# Patient Record
Sex: MALE
[De-identification: names, ages, dates, MRNs, and addresses within clinical notes are randomized per-mention and may not be internally consistent; named-entity substitution may affect disease eponyms.]

---

## 2024-05-09 PROBLEM — Z00.00 ENCOUNTER FOR PREVENTIVE HEALTH EXAMINATION: Status: ACTIVE | Noted: 2024-05-09

## 2024-05-14 ENCOUNTER — APPOINTMENT (OUTPATIENT)
Dept: ORTHOPEDIC SURGERY | Facility: CLINIC | Age: 84
End: 2024-05-14
Payer: MEDICARE

## 2024-05-14 DIAGNOSIS — M25.551 PAIN IN RIGHT HIP: ICD-10-CM

## 2024-05-14 PROCEDURE — 99203 OFFICE O/P NEW LOW 30 MIN: CPT

## 2024-05-15 ENCOUNTER — APPOINTMENT (OUTPATIENT)
Dept: PAIN MANAGEMENT | Facility: CLINIC | Age: 84
End: 2024-05-15
Payer: MEDICARE

## 2024-05-15 PROCEDURE — 99204 OFFICE O/P NEW MOD 45 MIN: CPT

## 2024-05-15 PROCEDURE — G2211 COMPLEX E/M VISIT ADD ON: CPT

## 2024-05-15 RX ORDER — VALSARTAN 160 MG/1
160 TABLET, COATED ORAL
Refills: 0 | Status: ACTIVE | COMMUNITY

## 2024-05-15 RX ORDER — ATORVASTATIN CALCIUM 20 MG/1
20 TABLET, FILM COATED ORAL
Refills: 0 | Status: ACTIVE | COMMUNITY

## 2024-05-15 RX ORDER — DORZOLAMIDE HYDROCHLORIDE TIMOLOL MALEATE 20; 5 MG/ML; MG/ML
2-0.5 SOLUTION/ DROPS OPHTHALMIC
Refills: 0 | Status: ACTIVE | COMMUNITY

## 2024-05-15 RX ORDER — AMLODIPINE BESYLATE 10 MG/1
10 TABLET ORAL
Refills: 0 | Status: ACTIVE | COMMUNITY

## 2024-05-15 NOTE — PHYSICAL EXAM
[de-identified] : R hip   No rashes, erythema, edema TTP at anterior hip, gluteus, GTB Stability: no gross instability ROM: Painful & limited ROM  MARK + Gait: limping

## 2024-05-15 NOTE — DISCUSSION/SUMMARY
[de-identified] : A discussion regarding available pain management treatment options occurred with the patient. These included interventional, rehabilitative, pharmacological, and alternative modalities. We will proceed with the following:   Interventional treatment options: 1. Proceed with a right hip injection under fluoroscopic guidance.  The patient is having significant R hip pain with minimal relief with conservative treatments so we decided to proceed with an intra-articular hip injection. The risks and benefits were discussed which included bruising, hematoma, worse pain, intravascular injection, steroid reaction. All questions were answered and concerns addressed. The patient understands that this is likely a temporary solution to their pain. The patient may have up to three intra-articular joint injections a year and needs to consider surgical options for longer term relief of pain should they not improve. They will schedule at the earliest convenience.   - Follow up after inj.   I Marizol Huertas attest that this documentation has been prepared under the direction and in the presence of provider Dr. Spike Burger.  The documentation recorded by the scribe in my presence, accurately reflects the service I personally performed, and the decisions made by me with my edits as appropriate.   Spike Burger, DO

## 2024-05-15 NOTE — HISTORY OF PRESENT ILLNESS
[FreeTextEntry1] : Mr. Fox is an 84-year-old male presenting to establish care for his right hip pain. He is accompanied by his daughter for today's visit. He has had this pain for about 1 year. He has been seen by Orthopedics, Dr. Sutherland, and was diagnosed with right hip osteoarthritis. He continues with complaints of right hip pain which refers into the groin and into the anterior portion of the thigh. He notes a constant aching sensation in the hip along with a sharp pain at times. Pain is made worse with any weight bearing or when getting into or out of the car. He does also note tenderness to palpation over the hip. Pain is present daily and limits his ADLs.

## 2024-05-28 ENCOUNTER — APPOINTMENT (OUTPATIENT)
Dept: PAIN MANAGEMENT | Facility: CLINIC | Age: 84
End: 2024-05-28
Payer: MEDICARE

## 2024-05-28 PROCEDURE — 20610 DRAIN/INJ JOINT/BURSA W/O US: CPT | Mod: RT

## 2024-05-28 PROCEDURE — 77002 NEEDLE LOCALIZATION BY XRAY: CPT

## 2024-05-29 NOTE — PROCEDURE
[FreeTextEntry3] : Date of Service: 05/28/2024   Account: [id number]  Patient: BRENT RHODES   YOB: 1940  Age: 84 year  Surgeon:      Spike Burger DO  Pre-Operative Diagnosis: Right Primary Osteoarthritis of Hip (M16.0)  Post-Operative Diagnosis: Same  Procedure: Right Hip arthrogram and steroid injection under fluoroscopic guidance.    This procedure was carried out using fluoroscopic guidance. The risks and benefits of the procedure were discussed extensively with the patient. The consent of the patient was obtained and the following procedure was performed. The patient was placed in the supine position on the fluoroscopic table and the area was prepped and draped in a sterile fashion. A timeout was performed with all essential staff present and the site and side were verified.  The patient was placed in the supine position with right hip flexed and externally rotated 25 degrees. The area of the right groin was prepped and draped in a sterile fashion. The fluoroscopic image intensifier was then positioned so that the right hip appeared in view, and the midline intertrochanteric region was identified and marked. A 25 gauge spinal needle was then inserted and directed into this right hip intra-capsular region. After negative aspiration for heme and CSF, 3 cc of Omnipaque was injected and appeared to fill the joint margins.  Right hip arthrogram showed no intravascular flow, and good spread around the femoral head and to the acetabulum. An injectate of 4 cc 0.25% Marcaine plus 10mg decadron was then injected into the right hip space.   The needle was subsequently removed. Vital signs remained normal. Pulse oximeter was used throughout the procedure and the patient's pulse and oxygen saturation remained within normal limits. The patient tolerated the procedure well. There were no complications. The patient was instructed to apply ice over the injection sites for twenty minutes every two hours for the next 24 to 48 hours.  Disposition:   1. The patient was advised to F/U in 1-2 weeks to assess the response to the injection.  2. The patient was also instructed to contact me immediately if there were any concerns related to the procedure performed.

## 2024-06-04 RX ORDER — TRAMADOL HYDROCHLORIDE 50 MG/1
50 TABLET, COATED ORAL
Qty: 42 | Refills: 0 | Status: ACTIVE | COMMUNITY
Start: 2024-06-04 | End: 1900-01-01

## 2024-06-12 ENCOUNTER — APPOINTMENT (OUTPATIENT)
Dept: PAIN MANAGEMENT | Facility: CLINIC | Age: 84
End: 2024-06-12
Payer: MEDICARE

## 2024-06-12 VITALS — HEIGHT: 67 IN | BODY MASS INDEX: 27.78 KG/M2 | WEIGHT: 177 LBS

## 2024-06-12 DIAGNOSIS — M16.11 UNILATERAL PRIMARY OSTEOARTHRITIS, RIGHT HIP: ICD-10-CM

## 2024-06-12 PROCEDURE — 99212 OFFICE O/P EST SF 10 MIN: CPT

## 2024-06-12 NOTE — PHYSICAL EXAM
[de-identified] : R hip   No rashes, erythema, edema TTP at anterior hip, gluteus, GTB Stability: no gross instability ROM: limited ROM  MARK + Gait: limping

## 2024-06-12 NOTE — DISCUSSION/SUMMARY
[de-identified] : A discussion regarding available pain management treatment options occurred with the patient. These included interventional, rehabilitative, pharmacological, and alternative modalities. We will proceed with the following:   Interventional treatment options: - None indicated at this time.   - Follow up in 4 months. If his pain returns, I will proceed with a repeat injection. He is also scheduled to see Orthopedics which I advised him to do as well.   I Marizol Huertas attest that this documentation has been prepared under the direction and in the presence of provider Dr. Spike Burger.  The documentation recorded by the scribe in my presence, accurately reflects the service I personally performed, and the decisions made by me with my edits as appropriate.   Spike Burger, DO

## 2024-06-12 NOTE — HISTORY OF PRESENT ILLNESS
[FreeTextEntry1] : Mr. Fox is an 84-year-old male presenting to establish care for his right hip pain. He is accompanied by his daughter for today's visit. He has had this pain for about 1 year. He has been seen by Orthopedics, Dr. Sutherland, and was diagnosed with right hip osteoarthritis. He continues with complaints of right hip pain which refers into the groin and into the anterior portion of the thigh. He notes a constant aching sensation in the hip along with a sharp pain at times. Pain is made worse with any weight bearing or when getting into or out of the car. He does also note tenderness to palpation over the hip. Pain is present daily and limits his ADLs.   TODAY, 6-: Revisit encounter. He is accompanied by his daughter today.   Since his last visit, he underwent a right hip injection on 5-. He has about 100% continued relief from this procedure. He denies any true pain. He does note some ongoing pressure in the hip however he is able to manage with the pressure like symptoms. He does continue to use a cane to ambulate for extra stability.

## 2024-06-14 ENCOUNTER — APPOINTMENT (OUTPATIENT)
Dept: ORTHOPEDIC SURGERY | Facility: CLINIC | Age: 84
End: 2024-06-14

## 2024-08-13 ENCOUNTER — APPOINTMENT (OUTPATIENT)
Dept: ORTHOPEDIC SURGERY | Facility: CLINIC | Age: 84
End: 2024-08-13
Payer: MEDICARE

## 2024-08-13 DIAGNOSIS — M16.11 UNILATERAL PRIMARY OSTEOARTHRITIS, RIGHT HIP: ICD-10-CM

## 2024-08-13 PROCEDURE — 99213 OFFICE O/P EST LOW 20 MIN: CPT

## 2024-08-13 PROCEDURE — 73502 X-RAY EXAM HIP UNI 2-3 VIEWS: CPT

## 2024-08-21 NOTE — HISTORY OF PRESENT ILLNESS
[de-identified] : f/u of right hip  has oa of right hip so far has been treated with tramadol and articular injections symptoms at this point stable surgery discussed upon today visit, imaging reviewed which does show oa can consider total hip replacement should nonop mngt be ineffective.

## 2024-09-10 ENCOUNTER — APPOINTMENT (OUTPATIENT)
Dept: PAIN MANAGEMENT | Facility: CLINIC | Age: 84
End: 2024-09-10
Payer: MEDICARE

## 2024-09-10 DIAGNOSIS — M25.551 PAIN IN RIGHT HIP: ICD-10-CM

## 2024-09-10 PROCEDURE — 99214 OFFICE O/P EST MOD 30 MIN: CPT

## 2024-09-10 NOTE — DISCUSSION/SUMMARY
[de-identified] : A discussion regarding available pain management treatment options occurred with the patient. These included interventional, rehabilitative, pharmacological, and alternative modalities. We will proceed with the following:   Interventional treatment options: 1. Proceed with a Right hip injection under fluoroscopic guidance.  The patient is having significant left hip pain with minimal relief with conservative treatments so we decided to proceed with an intra-articular hip injection. The risks and benefits were discussed which included bruising, hematoma, worse pain, intravascular injection, steroid reaction. All questions were answered and concerns addressed. The patient understands that this is likely a temporary solution to their pain. The patient may have up to three intra-articular joint injections a year and needs to consider surgical options for longer term relief of pain should they not improve. They will schedule at the earliest convenience.   - Follow up 2 weeks post injection.   I Marizol Huertas attest that this documentation has been prepared under the direction and in the presence of provider Dr. Spike Burger.  The documentation recorded by the scribe in my presence, accurately reflects the service I personally performed, and the decisions made by me with my edits as appropriate.   Spike Burger, DO

## 2024-09-10 NOTE — PHYSICAL EXAM
[de-identified] : R hip   No rashes, erythema, edema TTP at anterior hip, gluteus, GTB Stability: no gross instability ROM: limited ROM  MARK + Gait: limping   There is a need for assistive device such as cane/walker. the patient would benefit from a parking permit as well given his functional loss. 
[de-identified] : R hip   No rashes, erythema, edema TTP at anterior hip, gluteus, GTB Stability: no gross instability ROM: limited ROM  MARK + Gait: limping   There is a need for assistive device such as cane/walker. the patient would benefit from a parking permit as well given his functional loss. 
History/Exam/Medical Decision Making

## 2024-09-10 NOTE — HISTORY OF PRESENT ILLNESS
[FreeTextEntry1] : Mr. Fox is an 84-year-old male presenting to establish care for his right hip pain. He is accompanied by his daughter for today's visit. He has had this pain for about 1 year. He has been seen by Orthopedics, Dr. Sutherland, and was diagnosed with right hip osteoarthritis. He continues with complaints of right hip pain which refers into the groin and into the anterior portion of the thigh. He notes a constant aching sensation in the hip along with a sharp pain at times. Pain is made worse with any weight bearing or when getting into or out of the car. He does also note tenderness to palpation over the hip. Pain is present daily and limits his ADLs.   TODAY, 6-: Revisit encounter. He is accompanied by his daughter today.   Since his last visit, he underwent a right hip injection on 5-. He has about 100% continued relief from this procedure. He denies any true pain. He does note some ongoing pressure in the hip however he is able to manage with the pressure like symptoms. He does continue to use a cane to ambulate for extra stability.   9-: Revisit encounter. He is accompanied by his daughter today.   Since his last visit, he was seen by Dr. Abelino Gordon. He did recommend a total hip replacement should he not want to proceed with any additional interventional treatments. He is presenting with stable pain in the right hip. Symptoms continue to be in the hip and refers into the groin and into the anterior portion of the thigh at times. Symptoms are pain worse with weight bearing or getting in and out of the car. He continues to ambulate with a cane.

## 2024-09-10 NOTE — DISCUSSION/SUMMARY
[de-identified] : A discussion regarding available pain management treatment options occurred with the patient. These included interventional, rehabilitative, pharmacological, and alternative modalities. We will proceed with the following:   Interventional treatment options: 1. Proceed with a Right hip injection under fluoroscopic guidance.  The patient is having significant left hip pain with minimal relief with conservative treatments so we decided to proceed with an intra-articular hip injection. The risks and benefits were discussed which included bruising, hematoma, worse pain, intravascular injection, steroid reaction. All questions were answered and concerns addressed. The patient understands that this is likely a temporary solution to their pain. The patient may have up to three intra-articular joint injections a year and needs to consider surgical options for longer term relief of pain should they not improve. They will schedule at the earliest convenience.   - Follow up 2 weeks post injection.   I Marizol Huertas attest that this documentation has been prepared under the direction and in the presence of provider Dr. Spike Burger.  The documentation recorded by the scribe in my presence, accurately reflects the service I personally performed, and the decisions made by me with my edits as appropriate.   Spike Burger, DO

## 2024-09-12 ENCOUNTER — APPOINTMENT (OUTPATIENT)
Dept: PAIN MANAGEMENT | Facility: CLINIC | Age: 84
End: 2024-09-12
Payer: MEDICARE

## 2024-09-12 DIAGNOSIS — M16.11 UNILATERAL PRIMARY OSTEOARTHRITIS, RIGHT HIP: ICD-10-CM

## 2024-09-12 PROCEDURE — 77002 NEEDLE LOCALIZATION BY XRAY: CPT

## 2024-09-12 PROCEDURE — 20610 DRAIN/INJ JOINT/BURSA W/O US: CPT | Mod: RT

## 2024-09-13 NOTE — PROCEDURE
[FreeTextEntry3] : Date of Service: 09/12/2024   Account: [id number]  Patient: BRENT RHODES   YOB: 1940  Age: 84 year  Surgeon:      Spike Burger DO  Pre-Operative Diagnosis: Right Primary Osteoarthritis of Hip (M16.0)  Post-Operative Diagnosis: Same  Procedure: Right Hip arthrogram and steroid injection under fluoroscopic guidance.    This procedure was carried out using fluoroscopic guidance. The risks and benefits of the procedure were discussed extensively with the patient. The consent of the patient was obtained and the following procedure was performed. The patient was placed in the supine position on the fluoroscopic table and the area was prepped and draped in a sterile fashion. A timeout was performed with all essential staff present and the site and side were verified.  The patient was placed in the supine position with right hip flexed and externally rotated 25 degrees. The area of the right groin was prepped and draped in a sterile fashion. The fluoroscopic image intensifier was then positioned so that the right hip appeared in view, and the midline intertrochanteric region was identified and marked. A 25 gauge spinal needle was then inserted and directed into this right hip intra-capsular region. After negative aspiration for heme and CSF, 3 cc of Omnipaque was injected and appeared to fill the joint margins.  Right hip arthrogram showed no intravascular flow, and good spread around the femoral head and to the acetabulum. An injectate of 4 cc 0.25% Marcaine plus 10mg decadron was then injected into the right hip space.   The needle was subsequently removed. Vital signs remained normal. Pulse oximeter was used throughout the procedure and the patient's pulse and oxygen saturation remained within normal limits. The patient tolerated the procedure well. There were no complications. The patient was instructed to apply ice over the injection sites for twenty minutes every two hours for the next 24 to 48 hours.  Disposition:   1. The patient was advised to F/U in 1-2 weeks to assess the response to the injection.  2. The patient was also instructed to contact me immediately if there were any concerns related to the procedure performed.

## 2024-09-24 ENCOUNTER — APPOINTMENT (OUTPATIENT)
Dept: PAIN MANAGEMENT | Facility: CLINIC | Age: 84
End: 2024-09-24
Payer: MEDICARE

## 2024-09-24 DIAGNOSIS — M25.561 PAIN IN RIGHT KNEE: ICD-10-CM

## 2024-09-24 DIAGNOSIS — M16.11 UNILATERAL PRIMARY OSTEOARTHRITIS, RIGHT HIP: ICD-10-CM

## 2024-09-24 DIAGNOSIS — M25.562 PAIN IN RIGHT KNEE: ICD-10-CM

## 2024-09-24 PROCEDURE — 99214 OFFICE O/P EST MOD 30 MIN: CPT

## 2024-09-25 NOTE — DISCUSSION/SUMMARY
[de-identified] : A discussion regarding available pain management treatment options occurred with the patient. These included interventional, rehabilitative, pharmacological, and alternative modalities. We will proceed with the following:   Interventional treatment options: - None indicated at this time. He is scheduled to follow up with Dr. Ndiaye to discuss hip replacement.   Imagin. Ordered X-rays of the bilateral knees due to pain and decrease in range of motion in that area to delineate a pain generator.   - Follow up as needed.   I Marizol Huertas attest that this documentation has been prepared under the direction and in the presence of provider Dr. Spike Burger.  The documentation recorded by the scribe in my presence, accurately reflects the service I personally performed, and the decisions made by me with my edits as appropriate.   Spike Burger, DO

## 2024-09-25 NOTE — HISTORY OF PRESENT ILLNESS
[FreeTextEntry1] : Mr. Fox is an 84-year-old male presenting to establish care for his right hip pain. He is accompanied by his daughter for today's visit. He has had this pain for about 1 year. He has been seen by Orthopedics, Dr. Sutherland, and was diagnosed with right hip osteoarthritis. He continues with complaints of right hip pain which refers into the groin and into the anterior portion of the thigh. He notes a constant aching sensation in the hip along with a sharp pain at times. Pain is made worse with any weight bearing or when getting into or out of the car. He does also note tenderness to palpation over the hip. Pain is present daily and limits his ADLs.   TODAY, 6-: Revisit encounter. He is accompanied by his daughter today.   Since his last visit, he underwent a right hip injection on 5-. He has about 100% continued relief from this procedure. He denies any true pain. He does note some ongoing pressure in the hip however he is able to manage with the pressure like symptoms. He does continue to use a cane to ambulate for extra stability.   9-: Revisit encounter. He is accompanied by his daughter today.   Since his last visit, he was seen by Dr. Abelino Gordon. He did recommend a total hip replacement should he not want to proceed with any additional interventional treatments. He is presenting with stable pain in the right hip. Symptoms continue to be in the hip and refers into the groin and into the anterior portion of the thigh at times. Symptoms are pain worse with weight bearing or getting in and out of the car. He continues to ambulate with a cane.   9-: Revisit encounter.   Since his last visit, he underwent a right hip injection on 9-. He affords about 50% sustained relief from this procedure. He continues today with ongoing pain however it is not as bothersome as it was prior to the injection. He continues with some ongoing stiffness and aching pain in the hip especially with weight bearing. He continues to use a cane to ambulate. He is scheduled to follow up with Dr. West to discuss hip replacement. He has yet to use any Tramadol for the pain.

## 2024-09-25 NOTE — DISCUSSION/SUMMARY
[de-identified] : A discussion regarding available pain management treatment options occurred with the patient. These included interventional, rehabilitative, pharmacological, and alternative modalities. We will proceed with the following:   Interventional treatment options: - None indicated at this time. He is scheduled to follow up with Dr. Ndiaye to discuss hip replacement.   Imagin. Ordered X-rays of the bilateral knees due to pain and decrease in range of motion in that area to delineate a pain generator.   - Follow up as needed.   I Marizol Huertas attest that this documentation has been prepared under the direction and in the presence of provider Dr. Spike Burger.  The documentation recorded by the scribe in my presence, accurately reflects the service I personally performed, and the decisions made by me with my edits as appropriate.   Spike Burger, DO

## 2024-09-25 NOTE — PHYSICAL EXAM
[de-identified] : R hip   No rashes, erythema, edema TTP at anterior hip, gluteus, GTB Stability: no gross instability ROM: limited ROM  MARK + Gait: limping   There is a need for assistive device such as cane/walker. the patient would benefit from a parking permit as well given his functional loss.

## 2024-09-25 NOTE — PHYSICAL EXAM
[de-identified] : R hip   No rashes, erythema, edema TTP at anterior hip, gluteus, GTB Stability: no gross instability ROM: limited ROM  MARK + Gait: limping   There is a need for assistive device such as cane/walker. the patient would benefit from a parking permit as well given his functional loss.

## 2024-11-26 ENCOUNTER — APPOINTMENT (OUTPATIENT)
Dept: ORTHOPEDIC SURGERY | Facility: CLINIC | Age: 84
End: 2024-11-26
Payer: MEDICARE

## 2024-11-26 PROCEDURE — 99214 OFFICE O/P EST MOD 30 MIN: CPT

## 2024-12-03 ENCOUNTER — APPOINTMENT (OUTPATIENT)
Dept: PAIN MANAGEMENT | Facility: CLINIC | Age: 84
End: 2024-12-03
Payer: MEDICARE

## 2024-12-03 DIAGNOSIS — M16.11 UNILATERAL PRIMARY OSTEOARTHRITIS, RIGHT HIP: ICD-10-CM

## 2024-12-03 DIAGNOSIS — M25.551 PAIN IN RIGHT HIP: ICD-10-CM

## 2024-12-03 PROCEDURE — 99213 OFFICE O/P EST LOW 20 MIN: CPT

## 2024-12-31 ENCOUNTER — RESULT REVIEW (OUTPATIENT)
Age: 84
End: 2024-12-31

## 2024-12-31 ENCOUNTER — OUTPATIENT (OUTPATIENT)
Dept: OUTPATIENT SERVICES | Facility: HOSPITAL | Age: 84
LOS: 1 days | End: 2024-12-31
Payer: MEDICARE

## 2024-12-31 VITALS
WEIGHT: 177.03 LBS | HEIGHT: 67 IN | TEMPERATURE: 98 F | SYSTOLIC BLOOD PRESSURE: 153 MMHG | OXYGEN SATURATION: 99 % | RESPIRATION RATE: 16 BRPM | DIASTOLIC BLOOD PRESSURE: 76 MMHG | HEART RATE: 78 BPM

## 2024-12-31 DIAGNOSIS — Z98.890 OTHER SPECIFIED POSTPROCEDURAL STATES: Chronic | ICD-10-CM

## 2024-12-31 DIAGNOSIS — H26.9 UNSPECIFIED CATARACT: Chronic | ICD-10-CM

## 2024-12-31 DIAGNOSIS — Z01.818 ENCOUNTER FOR OTHER PREPROCEDURAL EXAMINATION: ICD-10-CM

## 2024-12-31 LAB
A1C WITH ESTIMATED AVERAGE GLUCOSE RESULT: 5.8 % — HIGH (ref 4–5.6)
ALBUMIN SERPL ELPH-MCNC: 4.4 G/DL — SIGNIFICANT CHANGE UP (ref 3.5–5.2)
ALP SERPL-CCNC: 57 U/L — SIGNIFICANT CHANGE UP (ref 30–115)
ALT FLD-CCNC: 8 U/L — SIGNIFICANT CHANGE UP (ref 0–41)
ANION GAP SERPL CALC-SCNC: 13 MMOL/L — SIGNIFICANT CHANGE UP (ref 7–14)
APTT BLD: 30.9 SEC — SIGNIFICANT CHANGE UP (ref 27–39.2)
AST SERPL-CCNC: 12 U/L — SIGNIFICANT CHANGE UP (ref 0–41)
BASOPHILS # BLD AUTO: 0.04 K/UL — SIGNIFICANT CHANGE UP (ref 0–0.2)
BASOPHILS NFR BLD AUTO: 0.7 % — SIGNIFICANT CHANGE UP (ref 0–1)
BILIRUB SERPL-MCNC: 0.4 MG/DL — SIGNIFICANT CHANGE UP (ref 0.2–1.2)
BLD GP AB SCN SERPL QL: SIGNIFICANT CHANGE UP
BUN SERPL-MCNC: 17 MG/DL — SIGNIFICANT CHANGE UP (ref 10–20)
CALCIUM SERPL-MCNC: 9.5 MG/DL — SIGNIFICANT CHANGE UP (ref 8.4–10.5)
CHLORIDE SERPL-SCNC: 106 MMOL/L — SIGNIFICANT CHANGE UP (ref 98–110)
CO2 SERPL-SCNC: 22 MMOL/L — SIGNIFICANT CHANGE UP (ref 17–32)
CREAT SERPL-MCNC: 0.9 MG/DL — SIGNIFICANT CHANGE UP (ref 0.7–1.5)
EGFR: 85 ML/MIN/1.73M2 — SIGNIFICANT CHANGE UP
EOSINOPHIL # BLD AUTO: 0.09 K/UL — SIGNIFICANT CHANGE UP (ref 0–0.7)
EOSINOPHIL NFR BLD AUTO: 1.5 % — SIGNIFICANT CHANGE UP (ref 0–8)
ESTIMATED AVERAGE GLUCOSE: 120 MG/DL — HIGH (ref 68–114)
GLUCOSE SERPL-MCNC: 104 MG/DL — HIGH (ref 70–99)
HCT VFR BLD CALC: 43.1 % — SIGNIFICANT CHANGE UP (ref 42–52)
HGB BLD-MCNC: 14.2 G/DL — SIGNIFICANT CHANGE UP (ref 14–18)
IMM GRANULOCYTES NFR BLD AUTO: 0.7 % — HIGH (ref 0.1–0.3)
INR BLD: 0.86 RATIO — SIGNIFICANT CHANGE UP (ref 0.65–1.3)
LYMPHOCYTES # BLD AUTO: 0.69 K/UL — LOW (ref 1.2–3.4)
LYMPHOCYTES # BLD AUTO: 11.3 % — LOW (ref 20.5–51.1)
MCHC RBC-ENTMCNC: 29 PG — SIGNIFICANT CHANGE UP (ref 27–31)
MCHC RBC-ENTMCNC: 32.9 G/DL — SIGNIFICANT CHANGE UP (ref 32–37)
MCV RBC AUTO: 88.1 FL — SIGNIFICANT CHANGE UP (ref 80–94)
MONOCYTES # BLD AUTO: 0.6 K/UL — SIGNIFICANT CHANGE UP (ref 0.1–0.6)
MONOCYTES NFR BLD AUTO: 9.8 % — HIGH (ref 1.7–9.3)
MRSA PCR RESULT.: NEGATIVE — SIGNIFICANT CHANGE UP
NEUTROPHILS # BLD AUTO: 4.67 K/UL — SIGNIFICANT CHANGE UP (ref 1.4–6.5)
NEUTROPHILS NFR BLD AUTO: 76 % — HIGH (ref 42.2–75.2)
NRBC # BLD: 0 /100 WBCS — SIGNIFICANT CHANGE UP (ref 0–0)
PLATELET # BLD AUTO: 268 K/UL — SIGNIFICANT CHANGE UP (ref 130–400)
PMV BLD: 10.3 FL — SIGNIFICANT CHANGE UP (ref 7.4–10.4)
POTASSIUM SERPL-MCNC: 4.6 MMOL/L — SIGNIFICANT CHANGE UP (ref 3.5–5)
POTASSIUM SERPL-SCNC: 4.6 MMOL/L — SIGNIFICANT CHANGE UP (ref 3.5–5)
PROT SERPL-MCNC: 7.1 G/DL — SIGNIFICANT CHANGE UP (ref 6–8)
PROTHROM AB SERPL-ACNC: 10.1 SEC — SIGNIFICANT CHANGE UP (ref 9.95–12.87)
RBC # BLD: 4.89 M/UL — SIGNIFICANT CHANGE UP (ref 4.7–6.1)
RBC # FLD: 14.1 % — SIGNIFICANT CHANGE UP (ref 11.5–14.5)
SODIUM SERPL-SCNC: 141 MMOL/L — SIGNIFICANT CHANGE UP (ref 135–146)
WBC # BLD: 6.13 K/UL — SIGNIFICANT CHANGE UP (ref 4.8–10.8)
WBC # FLD AUTO: 6.13 K/UL — SIGNIFICANT CHANGE UP (ref 4.8–10.8)

## 2024-12-31 PROCEDURE — 73502 X-RAY EXAM HIP UNI 2-3 VIEWS: CPT | Mod: RT

## 2024-12-31 PROCEDURE — 93005 ELECTROCARDIOGRAM TRACING: CPT

## 2024-12-31 PROCEDURE — 85730 THROMBOPLASTIN TIME PARTIAL: CPT

## 2024-12-31 PROCEDURE — 83036 HEMOGLOBIN GLYCOSYLATED A1C: CPT

## 2024-12-31 PROCEDURE — 36415 COLL VENOUS BLD VENIPUNCTURE: CPT

## 2024-12-31 PROCEDURE — 87640 STAPH A DNA AMP PROBE: CPT

## 2024-12-31 PROCEDURE — 93010 ELECTROCARDIOGRAM REPORT: CPT

## 2024-12-31 PROCEDURE — 85610 PROTHROMBIN TIME: CPT

## 2024-12-31 PROCEDURE — 85025 COMPLETE CBC W/AUTO DIFF WBC: CPT

## 2024-12-31 PROCEDURE — 86850 RBC ANTIBODY SCREEN: CPT

## 2024-12-31 PROCEDURE — 86900 BLOOD TYPING SEROLOGIC ABO: CPT

## 2024-12-31 PROCEDURE — 73502 X-RAY EXAM HIP UNI 2-3 VIEWS: CPT | Mod: 26,RT

## 2024-12-31 PROCEDURE — 99214 OFFICE O/P EST MOD 30 MIN: CPT | Mod: 25

## 2024-12-31 PROCEDURE — 87641 MR-STAPH DNA AMP PROBE: CPT

## 2024-12-31 PROCEDURE — 86901 BLOOD TYPING SEROLOGIC RH(D): CPT

## 2024-12-31 PROCEDURE — 80053 COMPREHEN METABOLIC PANEL: CPT

## 2024-12-31 NOTE — H&P PST ADULT - NSICDXPASTMEDICALHX_GEN_ALL_CORE_FT
PAST MEDICAL HISTORY:  BCC (basal cell carcinoma)     Benign essential HTN     BPH (benign prostatic hyperplasia)     Cataract     Hyperlipidemia

## 2024-12-31 NOTE — H&P PST ADULT - REASON FOR ADMISSION
Case Type: OP [facility]  Confirmed Surgery DateTime: Procedure: Right Total Hip Replacement  : YesLaterality: RightLength of Procedure: 120 Minutes  Anesthesia Type: Regional

## 2024-12-31 NOTE — H&P PST ADULT - NSICDXFAMILYHX_GEN_ALL_CORE_FT
FAMILY HISTORY:  Father  Still living? No  Family history of diabetes mellitus (DM), Age at diagnosis: Age Unknown  FH: heart attack, Age at diagnosis: Age Unknown    Mother  Still living? No  Family history of cerebral hemorrhage, Age at diagnosis: Age Unknown

## 2024-12-31 NOTE — H&P PST ADULT - NSANTHOSAYNRD_GEN_A_CORE
No. GARRET screening performed.  STOP BANG Legend: 0-2 = LOW Risk; 3-4 = INTERMEDIATE Risk; 5-8 = HIGH Risk

## 2024-12-31 NOTE — H&P PST ADULT - HISTORY OF PRESENT ILLNESS
PATIENT CURRENTLY DENIES CHEST PAIN  SHORTNESS OF BREATH  PALPITATIONS,  DYSURIA, OR UPPER RESPIRATORY INFECTION IN PAST 2 WEEKS  PATIENT REPORTS HISTORY OF OSTEOARTHRITIS FOR MANY YEARS.  PAIN AT TIMES ON THE HIP, 5-6 ON PAIN SCALE, CONTROLLED BY TYLENOL, DENIES FALL  SCHEDULED FOR SURGERY ABOVE.  Denies travel outside the [place] in the past 30 days  Patient denies any signs or symptoms of COVID 19 and denies contact with known positive individuals.         YES Anesthesia Alert--Difficult Airway CLASS IV  NO--History of neck surgery or radiation  NO--Limited ROM of neck  NO--History of Malignant hyperthermia  NO--No personal or family history of Pseudocholinesterase deficiency.  NO--Prior Anesthesia Complication  NO--Latex Allergy  NO--Loose teeth  NO--History of Rheumatoid Arthritis  NO--Bleeding risk  NO--GARRET  RIGHT EYE PATCH, CATARACT SURGERY 12/23/24_____    DASI 5.07    RCRI  1    PT DENIES ANY RASHES, ABRASION, OR OPEN WOUNDS OR CUTS    AS PER THE PT, THIS IS HIS/HER COMPLETE MEDICAL AND SURGICAL HX, INCLUDING MEDICATIONS PRESCRIBED AND OVER THE COUNTER    Patient/Guardian understands the instructions and was given the opportunity to ask questions and have them answered.    pt denies any suicidal ideation or thoughts, pt states not a threat to self or others

## 2025-01-01 DIAGNOSIS — M16.11 UNILATERAL PRIMARY OSTEOARTHRITIS, RIGHT HIP: ICD-10-CM

## 2025-01-01 DIAGNOSIS — Z01.818 ENCOUNTER FOR OTHER PREPROCEDURAL EXAMINATION: ICD-10-CM

## 2025-01-14 ENCOUNTER — APPOINTMENT (OUTPATIENT)
Dept: PAIN MANAGEMENT | Facility: CLINIC | Age: 85
End: 2025-01-14

## 2025-01-20 ENCOUNTER — RESULT REVIEW (OUTPATIENT)
Age: 85
End: 2025-01-20

## 2025-01-20 ENCOUNTER — INPATIENT (INPATIENT)
Facility: HOSPITAL | Age: 85
LOS: 0 days | Discharge: HOME HEALTH | DRG: 554 | End: 2025-01-21
Attending: ORTHOPAEDIC SURGERY | Admitting: ORTHOPAEDIC SURGERY
Payer: MEDICARE

## 2025-01-20 ENCOUNTER — APPOINTMENT (OUTPATIENT)
Dept: ORTHOPEDIC SURGERY | Facility: HOSPITAL | Age: 85
End: 2025-01-20

## 2025-01-20 VITALS
HEART RATE: 89 BPM | HEIGHT: 67 IN | OXYGEN SATURATION: 99 % | WEIGHT: 177.03 LBS | TEMPERATURE: 97 F | RESPIRATION RATE: 18 BRPM | SYSTOLIC BLOOD PRESSURE: 153 MMHG

## 2025-01-20 DIAGNOSIS — M16.11 UNILATERAL PRIMARY OSTEOARTHRITIS, RIGHT HIP: ICD-10-CM

## 2025-01-20 DIAGNOSIS — H26.9 UNSPECIFIED CATARACT: Chronic | ICD-10-CM

## 2025-01-20 DIAGNOSIS — Z98.890 OTHER SPECIFIED POSTPROCEDURAL STATES: Chronic | ICD-10-CM

## 2025-01-20 LAB
BLD GP AB SCN SERPL QL: SIGNIFICANT CHANGE UP
GLUCOSE BLDC GLUCOMTR-MCNC: 119 MG/DL — HIGH (ref 70–99)

## 2025-01-20 PROCEDURE — 36415 COLL VENOUS BLD VENIPUNCTURE: CPT

## 2025-01-20 PROCEDURE — 94010 BREATHING CAPACITY TEST: CPT

## 2025-01-20 PROCEDURE — 80048 BASIC METABOLIC PNL TOTAL CA: CPT

## 2025-01-20 PROCEDURE — 97162 PT EVAL MOD COMPLEX 30 MIN: CPT | Mod: GP

## 2025-01-20 PROCEDURE — C1776: CPT

## 2025-01-20 PROCEDURE — 88311 DECALCIFY TISSUE: CPT | Mod: 26

## 2025-01-20 PROCEDURE — 88311 DECALCIFY TISSUE: CPT

## 2025-01-20 PROCEDURE — 85027 COMPLETE CBC AUTOMATED: CPT

## 2025-01-20 PROCEDURE — 97165 OT EVAL LOW COMPLEX 30 MIN: CPT | Mod: GO

## 2025-01-20 PROCEDURE — 27130 TOTAL HIP ARTHROPLASTY: CPT | Mod: RT

## 2025-01-20 PROCEDURE — 73502 X-RAY EXAM HIP UNI 2-3 VIEWS: CPT | Mod: RT

## 2025-01-20 PROCEDURE — 88305 TISSUE EXAM BY PATHOLOGIST: CPT

## 2025-01-20 PROCEDURE — 88305 TISSUE EXAM BY PATHOLOGIST: CPT | Mod: 26

## 2025-01-20 RX ORDER — CELECOXIB 200 MG
200 CAPSULE ORAL ONCE
Refills: 0 | Status: COMPLETED | OUTPATIENT
Start: 2025-01-20 | End: 2025-01-20

## 2025-01-20 RX ORDER — ANTISEPTIC SURGICAL SCRUB 0.04 MG/ML
1 SOLUTION TOPICAL
Refills: 0 | Status: DISCONTINUED | OUTPATIENT
Start: 2025-01-20 | End: 2025-01-21

## 2025-01-20 RX ORDER — POLYETHYLENE GLYCOL 3350 17 G/17G
17 POWDER, FOR SOLUTION ORAL AT BEDTIME
Refills: 0 | Status: DISCONTINUED | OUTPATIENT
Start: 2025-01-20 | End: 2025-01-21

## 2025-01-20 RX ORDER — LATANOPROST 50 UG/ML
1 SOLUTION OPHTHALMIC AT BEDTIME
Refills: 0 | Status: DISCONTINUED | OUTPATIENT
Start: 2025-01-20 | End: 2025-01-21

## 2025-01-20 RX ORDER — ONDANSETRON 4 MG/1
4 TABLET, ORALLY DISINTEGRATING ORAL ONCE
Refills: 0 | Status: DISCONTINUED | OUTPATIENT
Start: 2025-01-20 | End: 2025-01-20

## 2025-01-20 RX ORDER — HYDROMORPHONE HYDROCHLORIDE 4 MG/ML
0.5 INJECTION, SOLUTION INTRAMUSCULAR; INTRAVENOUS; SUBCUTANEOUS
Refills: 0 | Status: DISCONTINUED | OUTPATIENT
Start: 2025-01-20 | End: 2025-01-20

## 2025-01-20 RX ORDER — ATORVASTATIN CALCIUM 80 MG/1
20 TABLET, FILM COATED ORAL AT BEDTIME
Refills: 0 | Status: DISCONTINUED | OUTPATIENT
Start: 2025-01-20 | End: 2025-01-21

## 2025-01-20 RX ORDER — OXYCODONE HYDROCHLORIDE 30 MG/1
5 TABLET ORAL ONCE
Refills: 0 | Status: DISCONTINUED | OUTPATIENT
Start: 2025-01-20 | End: 2025-01-20

## 2025-01-20 RX ORDER — SODIUM CHLORIDE 9 G/ML
1000 INJECTION, SOLUTION INTRAVENOUS
Refills: 0 | Status: DISCONTINUED | OUTPATIENT
Start: 2025-01-20 | End: 2025-01-20

## 2025-01-20 RX ORDER — PANTOPRAZOLE 20 MG/1
40 TABLET, DELAYED RELEASE ORAL
Refills: 0 | Status: DISCONTINUED | OUTPATIENT
Start: 2025-01-20 | End: 2025-01-21

## 2025-01-20 RX ORDER — ATORVASTATIN CALCIUM 40 MG/1
1 TABLET, FILM COATED ORAL
Refills: 0 | DISCHARGE

## 2025-01-20 RX ORDER — ACETAMINOPHEN 160 MG/5ML
1000 SUSPENSION ORAL ONCE
Refills: 0 | Status: COMPLETED | OUTPATIENT
Start: 2025-01-20 | End: 2025-01-20

## 2025-01-20 RX ORDER — MAGNESIUM HYDROXIDE 400 MG/5ML
30 SUSPENSION, ORAL (FINAL DOSE FORM) ORAL DAILY
Refills: 0 | Status: DISCONTINUED | OUTPATIENT
Start: 2025-01-20 | End: 2025-01-21

## 2025-01-20 RX ORDER — TRAMADOL HYDROCHLORIDE 100 MG/1
50 TABLET, EXTENDED RELEASE ORAL EVERY 4 HOURS
Refills: 0 | Status: DISCONTINUED | OUTPATIENT
Start: 2025-01-20 | End: 2025-01-21

## 2025-01-20 RX ORDER — ONDANSETRON 4 MG/1
4 TABLET, ORALLY DISINTEGRATING ORAL EVERY 6 HOURS
Refills: 0 | Status: DISCONTINUED | OUTPATIENT
Start: 2025-01-20 | End: 2025-01-21

## 2025-01-20 RX ORDER — ACETAMINOPHEN 160 MG/5ML
650 SUSPENSION ORAL EVERY 6 HOURS
Refills: 0 | Status: DISCONTINUED | OUTPATIENT
Start: 2025-01-20 | End: 2025-01-21

## 2025-01-20 RX ORDER — DUTASTERIDE 0.5 MG/1
1 CAPSULE, LIQUID FILLED ORAL
Refills: 0 | DISCHARGE

## 2025-01-20 RX ORDER — AMLODIPINE BESYLATE 5 MG
10 TABLET ORAL DAILY
Refills: 0 | Status: DISCONTINUED | OUTPATIENT
Start: 2025-01-20 | End: 2025-01-21

## 2025-01-20 RX ORDER — VALSARTAN 80 MG
160 TABLET ORAL DAILY
Refills: 0 | Status: DISCONTINUED | OUTPATIENT
Start: 2025-01-20 | End: 2025-01-21

## 2025-01-20 RX ORDER — ASPIRIN 81 MG/1
81 TABLET, COATED ORAL
Refills: 0 | Status: DISCONTINUED | OUTPATIENT
Start: 2025-01-20 | End: 2025-01-21

## 2025-01-20 RX ORDER — BACTERIOSTATIC SODIUM CHLORIDE 0.9 %
1000 VIAL (ML) INJECTION
Refills: 0 | Status: DISCONTINUED | OUTPATIENT
Start: 2025-01-20 | End: 2025-01-21

## 2025-01-20 RX ORDER — CEFAZOLIN SODIUM IN 0.9 % NACL 2 G/10 ML
2000 SYRINGE (ML) INTRAVENOUS EVERY 8 HOURS
Refills: 0 | Status: COMPLETED | OUTPATIENT
Start: 2025-01-20 | End: 2025-01-21

## 2025-01-20 RX ORDER — VALSARTAN 80 MG/1
1 TABLET ORAL
Refills: 0 | DISCHARGE

## 2025-01-20 RX ORDER — KETOROLAC TROMETHAMINE 10 MG
15 TABLET ORAL EVERY 6 HOURS
Refills: 0 | Status: DISCONTINUED | OUTPATIENT
Start: 2025-01-20 | End: 2025-01-21

## 2025-01-20 RX ORDER — SENNOSIDES 8.6 MG
2 TABLET ORAL AT BEDTIME
Refills: 0 | Status: DISCONTINUED | OUTPATIENT
Start: 2025-01-20 | End: 2025-01-21

## 2025-01-20 RX ADMIN — ACETAMINOPHEN 650 MILLIGRAM(S): 160 SUSPENSION ORAL at 18:33

## 2025-01-20 RX ADMIN — Medication 200 MILLIGRAM(S): at 11:45

## 2025-01-20 RX ADMIN — ATORVASTATIN CALCIUM 20 MILLIGRAM(S): 80 TABLET, FILM COATED ORAL at 21:37

## 2025-01-20 RX ADMIN — Medication 100 MILLIGRAM(S): at 21:36

## 2025-01-20 RX ADMIN — Medication 75 MILLILITER(S): at 21:33

## 2025-01-20 RX ADMIN — Medication 10 MILLIGRAM(S): at 21:43

## 2025-01-20 RX ADMIN — Medication 15 MILLIGRAM(S): at 18:32

## 2025-01-20 RX ADMIN — TRAMADOL HYDROCHLORIDE 50 MILLIGRAM(S): 100 TABLET, EXTENDED RELEASE ORAL at 22:34

## 2025-01-20 RX ADMIN — ACETAMINOPHEN 1000 MILLIGRAM(S): 160 SUSPENSION ORAL at 11:45

## 2025-01-20 RX ADMIN — Medication 15 MILLIGRAM(S): at 19:36

## 2025-01-20 RX ADMIN — ACETAMINOPHEN 650 MILLIGRAM(S): 160 SUSPENSION ORAL at 19:35

## 2025-01-20 RX ADMIN — TRAMADOL HYDROCHLORIDE 50 MILLIGRAM(S): 100 TABLET, EXTENDED RELEASE ORAL at 21:34

## 2025-01-20 RX ADMIN — Medication 200 MILLIGRAM(S): at 11:29

## 2025-01-20 RX ADMIN — ACETAMINOPHEN 1000 MILLIGRAM(S): 160 SUSPENSION ORAL at 11:28

## 2025-01-20 RX ADMIN — Medication 5 MILLIGRAM(S): at 21:40

## 2025-01-20 NOTE — PRE-ANESTHESIA EVALUATION ADULT - NSATTENDATTESTRD_GEN_ALL_CORE
Patient is here today for 6 week PO FU of her TKA. She is progressing well.     xrays are  reviewed today with good alignment.    Plan; Patient to return in 6 weeks.      
The patient has been re-examined and I agree with the above assessment or I updated with my findings.

## 2025-01-20 NOTE — ASU PATIENT PROFILE, ADULT - NS PRO TALK SOMEONE YN
Good control in the hospital in past 24 hours. Patient on Trulicity, Metformin and Glipizide at home to treat and to continue on discharge.      Blood Sugars (AccuCheck):    Recent Labs     10/23/22  1942 10/24/22  1146 10/24/22  1632 10/24/22  2126 10/25/22  0713 10/25/22  1105   POCTGLUCOSE 178* 270* 216* 194* 152* 269*        · Plan is to continue to monitor POCT glucose 4 times a day with each meal and at bedtime and cover with Novolog low dose sliding scale insulin at Rehab.   · Continue 2000 calorie diabetic diet on discharge.   · Target pre-meal glucose goal is <140 with all random glucoses <180.     no

## 2025-01-20 NOTE — PATIENT PROFILE ADULT - NSPROPTRIGHTCAREGIVER_GEN_A_NUR
Medication: azathioprine    [facility] [place]    Ordering Provider: Joshua Ceja MD    Preferred Contact Number:      Who will be picking up: Other, [pharmacy     Advised patient that the nurse will call when the prescription is ready for  within 48-72 hours.      no

## 2025-01-20 NOTE — ASU PATIENT PROFILE, ADULT - NSICDXPASTMEDICALHX_GEN_ALL_CORE_FT
PAST MEDICAL HISTORY:  BCC (basal cell carcinoma)     Benign essential HTN     BPH (benign prostatic hyperplasia)     Cataract     Former smoker     Hyperlipidemia

## 2025-01-20 NOTE — PATIENT PROFILE ADULT - FALL HARM RISK - HARM RISK INTERVENTIONS
Assistance with ambulation/Assistance OOB with selected safe patient handling equipment/Communicate Risk of Fall with Harm to all staff/Discuss with provider need for PT consult/Monitor gait and stability/Provide patient with walking aids - walker, cane, crutches/Reinforce activity limits and safety measures with patient and family/Sit up slowly, dangle for a short time, stand at bedside before walking/Tailored Fall Risk Interventions/Use of alarms - bed, chair and/or voice tab/Visual Cue: Yellow wristband and red socks/Bed in lowest position, wheels locked, appropriate side rails in place/Call bell, personal items and telephone in reach/Instruct patient to call for assistance before getting out of bed or chair/Non-slip footwear when patient is out of bed/[place] to call system/Physically safe environment - no spills, clutter or unnecessary equipment/Purposeful Proactive Rounding/Room/bathroom lighting operational, light cord in reach

## 2025-01-21 ENCOUNTER — TRANSCRIPTION ENCOUNTER (OUTPATIENT)
Age: 85
End: 2025-01-21

## 2025-01-21 VITALS
TEMPERATURE: 98 F | SYSTOLIC BLOOD PRESSURE: 152 MMHG | RESPIRATION RATE: 17 BRPM | OXYGEN SATURATION: 96 % | DIASTOLIC BLOOD PRESSURE: 46 MMHG | HEART RATE: 87 BPM

## 2025-01-21 LAB
ANION GAP SERPL CALC-SCNC: 16 MMOL/L — HIGH (ref 7–14)
BUN SERPL-MCNC: 20 MG/DL — SIGNIFICANT CHANGE UP (ref 10–20)
CALCIUM SERPL-MCNC: 8.4 MG/DL — SIGNIFICANT CHANGE UP (ref 8.4–10.5)
CHLORIDE SERPL-SCNC: 103 MMOL/L — SIGNIFICANT CHANGE UP (ref 98–110)
CO2 SERPL-SCNC: 20 MMOL/L — SIGNIFICANT CHANGE UP (ref 17–32)
CREAT SERPL-MCNC: 1.1 MG/DL — SIGNIFICANT CHANGE UP (ref 0.7–1.5)
EGFR: 67 ML/MIN/1.73M2 — SIGNIFICANT CHANGE UP
GLUCOSE SERPL-MCNC: 225 MG/DL — HIGH (ref 70–99)
HCT VFR BLD CALC: 38.2 % — LOW (ref 42–52)
HGB BLD-MCNC: 12.3 G/DL — LOW (ref 14–18)
MCHC RBC-ENTMCNC: 28.6 PG — SIGNIFICANT CHANGE UP (ref 27–31)
MCHC RBC-ENTMCNC: 32.2 G/DL — SIGNIFICANT CHANGE UP (ref 32–37)
MCV RBC AUTO: 88.8 FL — SIGNIFICANT CHANGE UP (ref 80–94)
NRBC # BLD: 0 /100 WBCS — SIGNIFICANT CHANGE UP (ref 0–0)
NRBC BLD-RTO: 0 /100 WBCS — SIGNIFICANT CHANGE UP (ref 0–0)
PLATELET # BLD AUTO: 221 K/UL — SIGNIFICANT CHANGE UP (ref 130–400)
PMV BLD: 10.4 FL — SIGNIFICANT CHANGE UP (ref 7.4–10.4)
POTASSIUM SERPL-MCNC: 4.6 MMOL/L — SIGNIFICANT CHANGE UP (ref 3.5–5)
POTASSIUM SERPL-SCNC: 4.6 MMOL/L — SIGNIFICANT CHANGE UP (ref 3.5–5)
RBC # BLD: 4.3 M/UL — LOW (ref 4.7–6.1)
RBC # FLD: 13.7 % — SIGNIFICANT CHANGE UP (ref 11.5–14.5)
SODIUM SERPL-SCNC: 139 MMOL/L — SIGNIFICANT CHANGE UP (ref 135–146)
WBC # BLD: 14.75 K/UL — HIGH (ref 4.8–10.8)
WBC # FLD AUTO: 14.75 K/UL — HIGH (ref 4.8–10.8)

## 2025-01-21 PROCEDURE — 99221 1ST HOSP IP/OBS SF/LOW 40: CPT

## 2025-01-21 PROCEDURE — 73502 X-RAY EXAM HIP UNI 2-3 VIEWS: CPT | Mod: 26,RT

## 2025-01-21 RX ORDER — SENNOSIDES 8.6 MG
2 TABLET ORAL
Qty: 30 | Refills: 0
Start: 2025-01-21

## 2025-01-21 RX ORDER — NALOXONE HYDROCHLORIDE 3 MG/.1ML
1 SPRAY NASAL
Qty: 1 | Refills: 0
Start: 2025-01-21

## 2025-01-21 RX ORDER — METHOCARBAMOL 500 MG
500 TABLET ORAL
Refills: 0 | Status: DISCONTINUED | OUTPATIENT
Start: 2025-01-21 | End: 2025-01-21

## 2025-01-21 RX ORDER — IBUPROFEN 600 MG/1
1 TABLET, FILM COATED ORAL
Qty: 42 | Refills: 0
Start: 2025-01-21 | End: 2025-02-03

## 2025-01-21 RX ORDER — PANTOPRAZOLE 20 MG/1
1 TABLET, DELAYED RELEASE ORAL
Qty: 30 | Refills: 0
Start: 2025-01-21 | End: 2025-02-19

## 2025-01-21 RX ORDER — ASPIRIN 81 MG/1
81 TABLET, COATED ORAL DAILY
Refills: 0 | Status: DISCONTINUED | OUTPATIENT
Start: 2025-01-21 | End: 2025-01-21

## 2025-01-21 RX ORDER — TRAMADOL HYDROCHLORIDE 100 MG/1
1 TABLET, EXTENDED RELEASE ORAL
Qty: 30 | Refills: 0
Start: 2025-01-21

## 2025-01-21 RX ORDER — ACETAMINOPHEN 160 MG/5ML
2 SUSPENSION ORAL
Qty: 112 | Refills: 0
Start: 2025-01-21 | End: 2025-02-03

## 2025-01-21 RX ORDER — ASPIRIN 81 MG/1
1 TABLET, COATED ORAL
Qty: 30 | Refills: 0
Start: 2025-01-21 | End: 2025-02-19

## 2025-01-21 RX ORDER — METHOCARBAMOL 500 MG
750 TABLET ORAL ONCE
Refills: 0 | Status: COMPLETED | OUTPATIENT
Start: 2025-01-21 | End: 2025-01-21

## 2025-01-21 RX ADMIN — Medication 100 MILLIGRAM(S): at 05:00

## 2025-01-21 RX ADMIN — Medication 15 MILLIGRAM(S): at 06:45

## 2025-01-21 RX ADMIN — Medication 15 MILLIGRAM(S): at 06:46

## 2025-01-21 RX ADMIN — TRAMADOL HYDROCHLORIDE 50 MILLIGRAM(S): 100 TABLET, EXTENDED RELEASE ORAL at 09:02

## 2025-01-21 RX ADMIN — ACETAMINOPHEN 650 MILLIGRAM(S): 160 SUSPENSION ORAL at 00:30

## 2025-01-21 RX ADMIN — TRAMADOL HYDROCHLORIDE 50 MILLIGRAM(S): 100 TABLET, EXTENDED RELEASE ORAL at 01:32

## 2025-01-21 RX ADMIN — Medication 15 MILLIGRAM(S): at 14:40

## 2025-01-21 RX ADMIN — Medication 750 MILLIGRAM(S): at 01:31

## 2025-01-21 RX ADMIN — ACETAMINOPHEN 650 MILLIGRAM(S): 160 SUSPENSION ORAL at 06:45

## 2025-01-21 RX ADMIN — ACETAMINOPHEN 650 MILLIGRAM(S): 160 SUSPENSION ORAL at 13:45

## 2025-01-21 RX ADMIN — TRAMADOL HYDROCHLORIDE 50 MILLIGRAM(S): 100 TABLET, EXTENDED RELEASE ORAL at 02:34

## 2025-01-21 RX ADMIN — Medication 160 MILLIGRAM(S): at 06:47

## 2025-01-21 RX ADMIN — TRAMADOL HYDROCHLORIDE 50 MILLIGRAM(S): 100 TABLET, EXTENDED RELEASE ORAL at 09:44

## 2025-01-21 RX ADMIN — ACETAMINOPHEN 650 MILLIGRAM(S): 160 SUSPENSION ORAL at 14:40

## 2025-01-21 RX ADMIN — Medication 5 MILLIGRAM(S): at 13:45

## 2025-01-21 RX ADMIN — ACETAMINOPHEN 650 MILLIGRAM(S): 160 SUSPENSION ORAL at 00:47

## 2025-01-21 RX ADMIN — PANTOPRAZOLE 40 MILLIGRAM(S): 20 TABLET, DELAYED RELEASE ORAL at 06:46

## 2025-01-21 RX ADMIN — Medication 500 MILLIGRAM(S): at 09:03

## 2025-01-21 RX ADMIN — ANTISEPTIC SURGICAL SCRUB 1 APPLICATION(S): 0.04 SOLUTION TOPICAL at 06:47

## 2025-01-21 RX ADMIN — Medication 15 MILLIGRAM(S): at 00:47

## 2025-01-21 RX ADMIN — Medication 15 MILLIGRAM(S): at 13:45

## 2025-01-21 RX ADMIN — Medication 15 MILLIGRAM(S): at 00:30

## 2025-01-21 NOTE — CONSULT NOTE ADULT - SUBJECTIVE AND OBJECTIVE BOX
HOSPITALIST CONSULT for ortho History and Physical     BRENT RHODES  84y, Male  Allergy: No Known Allergies      CHIEF COMPLAINT: Total Hip Arthroplasty (21 Jan 2025 09:12)      HPI: 83 yo male with pmhx of htn ,hld presents with osteoarthritis  SP right hip replacement . Today has pain at site with stiffness. Denies any chest pain, shortness of breath or palpitations.   He is going to work with PT once pain is more controlled       HPI:    FAMILY HISTORY:  Family history of diabetes mellitus (DM) (Father)    FH: heart attack (Father)    Family history of cerebral hemorrhage (Mother)      PAST MEDICAL & SURGICAL HISTORY:  Benign essential HTN      Hyperlipidemia      BPH (benign prostatic hyperplasia)      BCC (basal cell carcinoma)      Cataract      Former smoker      H/O hernia repair      Status post Mohs surgery      Bilateral cataracts          SOCIAL HISTORY  Social History:      Home Medications:  amLODIPine 10 mg oral tablet: 1 tab(s) orally once a day (20 Jan 2025 11:34)  atorvastatin 20 mg oral tablet: 1 tab(s) orally once a day (20 Jan 2025 11:34)  dutasteride 0.5 mg oral capsule: 1 cap(s) orally once a day (20 Jan 2025 11:34)  valsartan 160 mg oral tablet: 1 tab(s) orally once a day (20 Jan 2025 11:34)      ROS  General: Denies fevers, chills, nightsweats, weight loss  HEENT: Denies headache, rhinorrhea, sore throat, eye pain  CV: Denies CP, palpitations  PULM: Denies SOB, cough  GI: Denies abdominal pain, diarrhea  : Denies dysuria, hematuria  MSK: Denies arthralgias  SKIN: Denies rash   NEURO: Denies paresthesias, weakness  PSYCH: Denies depression    VITALS:  T(F): 97.5, Max: 97.9 (01-21-25 @ 00:33)  HR: 87  BP: 152/46  RR: 17Vital Signs Last 24 Hrs  T(C): 36.4 (21 Jan 2025 12:23), Max: 36.6 (21 Jan 2025 00:33)  T(F): 97.5 (21 Jan 2025 12:23), Max: 97.9 (21 Jan 2025 00:33)  HR: 87 (21 Jan 2025 12:23) (55 - 87)  BP: 152/46 (21 Jan 2025 12:23) (114/64 - 163/72)  BP(mean): --  RR: 17 (21 Jan 2025 12:23) (15 - 19)  SpO2: 96% (21 Jan 2025 12:23) (96% - 100%)    Parameters below as of 21 Jan 2025 12:23  Patient On (Oxygen Delivery Method): room air        PHYSICAL EXAM:  Gen: NAD, resting in bed  HEENT: Normocephalic, atraumatic  Neck: supple, no lymphadenopathy  CV: Regular rate & regular rhythm  Lungs: CTABL no wheeze  Abdomen: Soft, NTND+ BS present  Ext: Warm, well perfused no CCE  Neuro: non focal, awake, CN II-XII intact   Skin: no rash, no erythema  Psych: no SI, HI, Hallucination     TESTS & MEASUREMENTS:                        12.3   14.75 )-----------( 221      ( 21 Jan 2025 09:13 )             38.2     01-21    139  |  103  |  20  ----------------------------<  225[H]  4.6   |  20  |  1.1    Ca    8.4      21 Jan 2025 09:13          Urinalysis Basic - ( 21 Jan 2025 09:13 )    Color: x / Appearance: x / SG: x / pH: x  Gluc: 225 mg/dL / Ketone: x  / Bili: x / Urobili: x   Blood: x / Protein: x / Nitrite: x   Leuk Esterase: x / RBC: x / WBC x   Sq Epi: x / Non Sq Epi: x / Bacteria: x            QRS axis to [] ° and NSR at a rate of [] BPM. There was no atrial enlargement. There was no ventricular hypertrophy. There were no ST-T changes and all intervals were normal.      INFECTIOUS DISEASES TESTING  MRSA PCR Result.: Negative (12-31-24 @ 14:10)      RADIOLOGY & ADDITIONAL TESTS:  I have personally reviewed the last Chest xray  CXR      CT      CARDIOLOGY TESTING      MEDICATIONS  (STANDING):  acetaminophen     Tablet .. 650 milliGRAM(s) Oral every 6 hours  amLODIPine   Tablet 10 milliGRAM(s) Oral daily  aspirin  chewable 81 milliGRAM(s) Oral daily  atorvastatin 20 milliGRAM(s) Oral at bedtime  chlorhexidine 2% Cloths 1 Application(s) Topical <User Schedule>  finasteride 5 milliGRAM(s) Oral daily  influenza  Vaccine (HIGH DOSE) 0.5 milliLiter(s) IntraMuscular once  latanoprost 0.005% Ophthalmic Solution 1 Drop(s) Both EYES at bedtime  pantoprazole    Tablet 40 milliGRAM(s) Oral before breakfast  polyethylene glycol 3350 17 Gram(s) Oral at bedtime  senna 2 Tablet(s) Oral at bedtime  sodium chloride 0.9%. 1000 milliLiter(s) (75 mL/Hr) IV Continuous <Continuous>  valsartan 160 milliGRAM(s) Oral daily    MEDICATIONS  (PRN):  magnesium hydroxide Suspension 30 milliLiter(s) Oral daily PRN Constipation  methocarbamol 500 milliGRAM(s) Oral two times a day PRN Muscle Spasm  ondansetron Injectable 4 milliGRAM(s) IV Push every 6 hours PRN Nausea and/or Vomiting  traMADol 50 milliGRAM(s) Oral every 4 hours PRN Severe Pain (7 - 10)      ANTIBIOTICS:      All available historical data has been reviewed    ASSESSMENT  84y M admitted with Primary osteoarthritis of right hip  HTN ,HLD cont all home meds the same  Mild reactive leukocytosis with signs or symptoms of infection   pain control , DVT prophylaxis and PT for dc planning

## 2025-01-21 NOTE — OCCUPATIONAL THERAPY INITIAL EVALUATION ADULT - GENERAL OBSERVATIONS, REHAB EVAL
13:00-13:25; chart reviewed, ok to treat by Occupational Therapist as confirmed by RN Pari Pt received seated in bedside chair +mepilex dressing (R) hip +LUE heplock in NAD. Pt reported 8/10 (R) groin pain, burning sensation when in standing with knee flexion; RN and Ortho PA Negrita aware. X-ray (R) hip pending; pt ok to be seen per Ortho PA. Pt in agreement with OT IE.

## 2025-01-21 NOTE — DISCHARGE NOTE PROVIDER - NSDCMRMEDTOKEN_GEN_ALL_CORE_FT
acetaminophen 325 mg oral tablet: 2 tab(s) orally every 6 hours post op pain  amLODIPine 10 mg oral tablet: 1 tab(s) orally once a day  aspirin 81 mg oral tablet, chewable: 1 tab(s) orally once a day to lower the risk of DVT after Total Hip Arthroplasty  atorvastatin 20 mg oral tablet: 1 tab(s) orally once a day  dutasteride 0.5 mg oral capsule: 1 cap(s) orally once a day  finasteride 5 mg oral tablet: 1 tab(s) orally once a day  ibuprofen 400 mg oral tablet: 1 tab(s) orally every 8 hours post op pain  naloxone 4 mg/0.1 mL nasal spray: 1 spray(s) intranasally once as needed for od take as directed in the event of accidental overdose  pantoprazole 40 mg oral delayed release tablet: 1 tab(s) orally once a day (before a meal) GI prophylaxis for 30 days after Total Hip Arthroplasty  senna leaf extract oral tablet: 2 tab(s) orally once a day (at bedtime) while on pain medication to prevent constipation  traMADol 50 mg oral tablet: 1 tab(s) orally every 4 to 6 hours as needed for Severe Pain (7 - 10) post op pain MDD: 5  valsartan 160 mg oral tablet: 1 tab(s) orally once a day

## 2025-01-21 NOTE — DISCHARGE NOTE PROVIDER - HOSPITAL COURSE
84 year old male admitted for an elective Total Hip Arthroplasty. The patient tolerated surgery well with no intra/post operative complications. The patient received intra/post operative antibiotics for infection prophylaxis and will be discharged on Aspirin 81mg once daily for 30 days to lower the risk of blood clots. The patient worked with Physical Therapy while admitted to the hospital and is stable for discharge.

## 2025-01-21 NOTE — OCCUPATIONAL THERAPY INITIAL EVALUATION ADULT - WEIGHT-BEARING RESTRICTIONS: CHAIR/BED,  REHAB EVAL
RLE/weight-bearing as tolerated
FAMILY HISTORY:  No pertinent family history in first degree relatives

## 2025-01-21 NOTE — DISCHARGE NOTE PROVIDER - CARE PROVIDER_API CALL
Murtaza Lowery  Orthopaedic Surgery  [address] [place] [address]  Phone: [phone]  Fax: [phone]  Scheduled Appointment: 02/13/2025 11:30 AM

## 2025-01-21 NOTE — OCCUPATIONAL THERAPY INITIAL EVALUATION ADULT - ADDITIONAL COMMENTS
PLOF obtained from pt. Pt owns RW, cane, raised toilet seat, reacher. Per pt, family will be able to assist upon d/c.   (+) driving

## 2025-01-21 NOTE — PHYSICAL THERAPY INITIAL EVALUATION ADULT - ADDITIONAL COMMENTS
Patient lives in alone in daughters downstairs apartment. Patient has 3 steps to enter house with hand rails on both side, then 1 step to enter his apartment with everything all on one floor. Patient used a straight cane for safety.

## 2025-01-21 NOTE — DISCHARGE NOTE NURSING/CASE MANAGEMENT/SOCIAL WORK - PATIENT PORTAL LINK FT
You can access the FollowMyHealth Patient Portal offered by [facility] by registering at the following website: [email or web address]. By joining [employer]’s FollowMyHealth portal, you will also be able to view your health information using other applications (apps) compatible with our system.

## 2025-01-21 NOTE — PHYSICAL THERAPY INITIAL EVALUATION ADULT - RANGE OF MOTION EXAMINATION, REHAB EVAL
R hip limited. R Knee/Ankle WFL/Left LE ROM was WFL (within functional limits)/deficits as listed below

## 2025-01-21 NOTE — OCCUPATIONAL THERAPY INITIAL EVALUATION ADULT - LIVES WITH, PROFILE
in an downstairs apartment (daughter lives upstairs) +3 steps to enter or 4 steps to enter +1 step +tub +raised toilet seat with vanity within reach

## 2025-01-21 NOTE — DISCHARGE NOTE NURSING/CASE MANAGEMENT/SOCIAL WORK - FINANCIAL ASSISTANCE
[facility] provides services at a reduced cost to those who are determined to be eligible through [facility]’s financial assistance program. Information regarding [facility]’s financial assistance program can be found by going to [email or web address] or by calling [phone].

## 2025-01-21 NOTE — PROGRESS NOTE ADULT - SUBJECTIVE AND OBJECTIVE BOX
84y Male POD #  1    S/P right Total Hip Arthroplasty     Patient seen and examined at bedside . The patient is awake and alert in NAD. No complaints of chest pain, SOB, N/V. c/o right groin inner thigh pain yesterday with ambulation, seems better today, the patient has ambulated and is voiding.     PAST MEDICAL & SURGICAL HISTORY:  Benign essential HTN    Hyperlipidemia    BPH (benign prostatic hyperplasia)    BCC (basal cell carcinoma)    Cataract    Former smoker    H/O hernia repair    Status post Mohs surgery    Bilateral cataracts          MEDICATIONS  (STANDING):  acetaminophen     Tablet .. 650 milliGRAM(s) Oral every 6 hours  amLODIPine   Tablet 10 milliGRAM(s) Oral daily  aspirin enteric coated 81 milliGRAM(s) Oral two times a day  atorvastatin 20 milliGRAM(s) Oral at bedtime  chlorhexidine 2% Cloths 1 Application(s) Topical <User Schedule>  finasteride 5 milliGRAM(s) Oral daily  influenza  Vaccine (HIGH DOSE) 0.5 milliLiter(s) IntraMuscular once  ketorolac   Injectable 15 milliGRAM(s) IV Push every 6 hours  latanoprost 0.005% Ophthalmic Solution 1 Drop(s) Both EYES at bedtime  pantoprazole    Tablet 40 milliGRAM(s) Oral before breakfast  polyethylene glycol 3350 17 Gram(s) Oral at bedtime  senna 2 Tablet(s) Oral at bedtime  sodium chloride 0.9%. 1000 milliLiter(s) (75 mL/Hr) IV Continuous <Continuous>  valsartan 160 milliGRAM(s) Oral daily    MEDICATIONS  (PRN):  magnesium hydroxide Suspension 30 milliLiter(s) Oral daily PRN Constipation  methocarbamol 500 milliGRAM(s) Oral two times a day PRN Muscle Spasm  ondansetron Injectable 4 milliGRAM(s) IV Push every 6 hours PRN Nausea and/or Vomiting  traMADol 50 milliGRAM(s) Oral every 4 hours PRN Severe Pain (7 - 10)        Vital Signs Last 24 Hrs  T(C): 36.4 (21 Jan 2025 08:20), Max: 36.6 (21 Jan 2025 00:33)  T(F): 97.6 (21 Jan 2025 08:20), Max: 97.9 (21 Jan 2025 00:33)  HR: 77 (21 Jan 2025 08:20) (55 - 89)  BP: 156/74 (21 Jan 2025 08:20) (114/64 - 163/72)  BP(mean): --  RR: 17 (21 Jan 2025 08:20) (15 - 19)  SpO2: 96% (21 Jan 2025 08:20) (96% - 100%)                      PE:  The patient was seen and examined at bedside          A&OX3, NAD          right hip dressing C/D/I , no pain on ROM of the right hip         Compartments soft, BLE Jan stockings and SCD in place          NVI, SILT           A/P:     # POD #  1     s/p right Total Hip Arthroplasty     - OOB to Chair   -PT/OT - wbat  -post op abx complete  -Pain control - per pain protocol   -Incentive Spirometry   -DVT Prophylaxis - aspirin bid x 30 days ( pt states gets nose bleeds from aspirin - will discuss with attg)  -GI ppx- continue Protonix   -f/u am labs   -discharge planning- home with home care

## 2025-01-21 NOTE — DISCHARGE NOTE NURSING/CASE MANAGEMENT/SOCIAL WORK - NSDCPEFALRISK_GEN_ALL_CORE
For information on Fall & Injury Prevention, visit: [email or web address] OR  [email or web address] OR  https://www.cdc.gov/steadi/patient.html

## 2025-01-21 NOTE — DISCHARGE NOTE PROVIDER - NSDCFUSCHEDAPPT_GEN_ALL_CORE_FT
Murtaza Lowery  [facility]  [address]  Scheduled Appointment: 02/13/2025    Spike Burger  [facility]  [address]   Scheduled Appointment: 03/04/2025

## 2025-01-21 NOTE — DISCHARGE NOTE PROVIDER - NSDCCPCAREPLAN_GEN_ALL_CORE_FT
PRINCIPAL DISCHARGE DIAGNOSIS  Diagnosis: Arthritis of right hip  Assessment and Plan of Treatment: Keep surgical site clean and dry, may remove dressing in 6  days . Call your surgeon if any wound drainage, redness , increasing pain, fevers over 101 or if you have any questions or concerns.  Ice pack to affected area q4-6h as needed   You may shower with the bandage on and once it is removed. Once it is removed  , do not scrub surgical site. Do not apply any lotions/moisturizers/creams to surgical site.  Take aspirin 81 mg daily for 30 days to lower the risk of blood clots.  Call to make your  post op appointment if you do not have one already.

## 2025-01-21 NOTE — OCCUPATIONAL THERAPY INITIAL EVALUATION ADULT - LEVEL OF INDEPENDENCE: TUB, REHAB EVAL
Pt reported 8/10 (R) groin burning sensation. Pt advised to sponge bathe and to practice tub transfer with therapist prior to performing independently to increase safety. Pt demonstrated good understanding and in agreement.

## 2025-01-23 LAB — SURGICAL PATHOLOGY STUDY: SIGNIFICANT CHANGE UP

## 2025-01-30 DIAGNOSIS — Z85.820 PERSONAL HISTORY OF MALIGNANT MELANOMA OF SKIN: ICD-10-CM

## 2025-01-30 DIAGNOSIS — M16.11 UNILATERAL PRIMARY OSTEOARTHRITIS, RIGHT HIP: ICD-10-CM

## 2025-01-30 DIAGNOSIS — E78.5 HYPERLIPIDEMIA, UNSPECIFIED: ICD-10-CM

## 2025-01-30 DIAGNOSIS — Z87.891 PERSONAL HISTORY OF NICOTINE DEPENDENCE: ICD-10-CM

## 2025-01-30 DIAGNOSIS — I10 ESSENTIAL (PRIMARY) HYPERTENSION: ICD-10-CM

## 2025-01-30 DIAGNOSIS — N40.0 BENIGN PROSTATIC HYPERPLASIA WITHOUT LOWER URINARY TRACT SYMPTOMS: ICD-10-CM

## 2025-02-13 ENCOUNTER — APPOINTMENT (OUTPATIENT)
Facility: CLINIC | Age: 85
End: 2025-02-13

## 2025-02-13 DIAGNOSIS — Z96.641 PRESENCE OF RIGHT ARTIFICIAL HIP JOINT: ICD-10-CM

## 2025-02-13 PROBLEM — H26.9 UNSPECIFIED CATARACT: Chronic | Status: ACTIVE | Noted: 2024-12-31

## 2025-02-13 PROBLEM — C44.91 BASAL CELL CARCINOMA OF SKIN, UNSPECIFIED: Chronic | Status: ACTIVE | Noted: 2024-12-31

## 2025-02-13 PROBLEM — Z87.891 PERSONAL HISTORY OF NICOTINE DEPENDENCE: Chronic | Status: ACTIVE | Noted: 2025-01-16

## 2025-02-13 PROBLEM — I10 ESSENTIAL (PRIMARY) HYPERTENSION: Chronic | Status: ACTIVE | Noted: 2024-12-31

## 2025-02-13 PROBLEM — E78.5 HYPERLIPIDEMIA, UNSPECIFIED: Chronic | Status: ACTIVE | Noted: 2024-12-31

## 2025-02-13 PROBLEM — N40.0 BENIGN PROSTATIC HYPERPLASIA WITHOUT LOWER URINARY TRACT SYMPTOMS: Chronic | Status: ACTIVE | Noted: 2024-12-31

## 2025-02-13 PROCEDURE — 99024 POSTOP FOLLOW-UP VISIT: CPT

## 2025-02-13 PROCEDURE — 73502 X-RAY EXAM HIP UNI 2-3 VIEWS: CPT

## 2025-02-21 PROBLEM — Z96.641 HISTORY OF RIGHT HIP REPLACEMENT: Status: ACTIVE | Noted: 2025-02-21

## 2025-02-25 ENCOUNTER — APPOINTMENT (OUTPATIENT)
Dept: ORTHOPEDIC SURGERY | Facility: CLINIC | Age: 85
End: 2025-02-25

## 2025-02-25 PROCEDURE — 99024 POSTOP FOLLOW-UP VISIT: CPT

## 2025-03-04 ENCOUNTER — APPOINTMENT (OUTPATIENT)
Dept: PAIN MANAGEMENT | Facility: CLINIC | Age: 85
End: 2025-03-04
Payer: MEDICARE

## 2025-03-04 DIAGNOSIS — M25.561 PAIN IN RIGHT KNEE: ICD-10-CM

## 2025-03-04 DIAGNOSIS — M25.562 PAIN IN RIGHT KNEE: ICD-10-CM

## 2025-03-04 PROCEDURE — 20610 DRAIN/INJ JOINT/BURSA W/O US: CPT | Mod: 50

## 2025-03-04 PROCEDURE — 99214 OFFICE O/P EST MOD 30 MIN: CPT | Mod: 25

## 2025-03-11 ENCOUNTER — APPOINTMENT (OUTPATIENT)
Dept: ORTHOPEDIC SURGERY | Facility: CLINIC | Age: 85
End: 2025-03-11
Payer: MEDICARE

## 2025-03-11 PROCEDURE — 99024 POSTOP FOLLOW-UP VISIT: CPT

## 2025-04-10 ENCOUNTER — APPOINTMENT (OUTPATIENT)
Facility: CLINIC | Age: 85
End: 2025-04-10
Payer: MEDICARE

## 2025-04-10 DIAGNOSIS — M25.561 PAIN IN RIGHT KNEE: ICD-10-CM

## 2025-04-10 DIAGNOSIS — M25.562 PAIN IN RIGHT KNEE: ICD-10-CM

## 2025-04-10 DIAGNOSIS — Z96.641 PRESENCE OF RIGHT ARTIFICIAL HIP JOINT: ICD-10-CM

## 2025-04-10 PROCEDURE — 99024 POSTOP FOLLOW-UP VISIT: CPT

## 2025-05-29 ENCOUNTER — APPOINTMENT (OUTPATIENT)
Facility: CLINIC | Age: 85
End: 2025-05-29
Payer: MEDICARE

## 2025-05-29 ENCOUNTER — RESULT CHARGE (OUTPATIENT)
Age: 85
End: 2025-05-29

## 2025-05-29 DIAGNOSIS — M25.562 PAIN IN RIGHT KNEE: ICD-10-CM

## 2025-05-29 DIAGNOSIS — Z96.641 PRESENCE OF RIGHT ARTIFICIAL HIP JOINT: ICD-10-CM

## 2025-05-29 DIAGNOSIS — M25.561 PAIN IN RIGHT KNEE: ICD-10-CM

## 2025-05-29 PROCEDURE — 99213 OFFICE O/P EST LOW 20 MIN: CPT

## 2025-05-29 PROCEDURE — 73502 X-RAY EXAM HIP UNI 2-3 VIEWS: CPT

## 2025-06-24 ENCOUNTER — APPOINTMENT (OUTPATIENT)
Dept: PAIN MANAGEMENT | Facility: CLINIC | Age: 85
End: 2025-06-24
Payer: MEDICARE

## 2025-06-24 PROCEDURE — 20610 DRAIN/INJ JOINT/BURSA W/O US: CPT | Mod: 50

## 2025-06-24 PROCEDURE — 99214 OFFICE O/P EST MOD 30 MIN: CPT | Mod: 25
